# Patient Record
Sex: FEMALE | Race: WHITE
[De-identification: names, ages, dates, MRNs, and addresses within clinical notes are randomized per-mention and may not be internally consistent; named-entity substitution may affect disease eponyms.]

---

## 2018-08-28 ENCOUNTER — HOSPITAL ENCOUNTER (OUTPATIENT)
Dept: HOSPITAL 63 - CT | Age: 48
Discharge: HOME | End: 2018-08-28
Attending: FAMILY MEDICINE
Payer: COMMERCIAL

## 2018-08-28 DIAGNOSIS — N32.89: Primary | ICD-10-CM

## 2018-08-28 DIAGNOSIS — K42.9: ICD-10-CM

## 2018-08-28 PROCEDURE — 74177 CT ABD & PELVIS W/CONTRAST: CPT

## 2018-08-28 NOTE — RAD
CT ABD PELV W/ORAL IV CONTRAST dated 8/28/2018 11:11 AM

 

Indication:..Abnormal renal function,fatigue,enlarged lymph 

nodes,hypercalcemia,premenopausal.No surgery to abdomen. reduced dose 60ml

[id number] per dept. protocol. 30ml OMNI 240 in [place]<BR>.

 

Comparison: No comparison is available.

 

Technique: Contiguous axial imaging of the abdomen and pelvis performed 

after the administration of 60 cc Omnipaque 300.

One or more of the following individualized dose reduction techniques were

utilized for this examination:  1. Automated exposure control  2. 

Adjustment of the mA and/or kV according to patient size  3. Use of 

iterative reconstruction technique

 

Findings: 

Images of lung bases show patchy airspace disease in the bilateral lower 

lobes with areas of vague nodular pleural thickening bilaterally. Heart 

size within normal limits. No pleural or pericardial effusion.

 

Liver, spleen, pancreas, adrenal glands and kidneys are unremarkable. No 

hydronephrosis. There is mild wall thickening of the gallbladder, 

nonspecific.

 

Partially opacified GI tract normal in caliber and contour. No focal bowel

wall thickening. No inflammatory stranding in the mesentery. No ascites or

lymphadenopathy. Abdominal aorta normal in caliber.

 

Images of pelvis show moderately distended urinary bladder. Uterus and 

adnexa are unremarkable. No free pelvic fluid or pelvic lymphadenopathy.

 

Bone windows show no acute findings. Small umbilical hernia containing 

only fat.  

 

 

IMPRESSION:

1. Patchy bibasilar airspace disease, atelectasis versus pneumonia. There 

are associated areas of nodular pleural thickening for which follow-up 

imaging after treatment is recommended to ensure resolution.

2. Otherwise no acute abnormality of abdomen or pelvis. Normal appendix.

3. Mild wall thickening of the gallbladder, nonspecific.

4. Distended urinary bladder.

 

Electronically signed by: James Galvin MD (8/28/2018 11:41 AM) 

[id number]